# Patient Record
Sex: FEMALE | Race: WHITE | Employment: PART TIME | URBAN - NONMETROPOLITAN AREA
[De-identification: names, ages, dates, MRNs, and addresses within clinical notes are randomized per-mention and may not be internally consistent; named-entity substitution may affect disease eponyms.]

---
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
## 2022-03-01 ENCOUNTER — NURSE ONLY (OUTPATIENT)
Dept: LAB | Age: 22
End: 2022-03-01
Payer: COMMERCIAL

## 2022-03-01 DIAGNOSIS — N94.6 DYSMENORRHEA: Primary | ICD-10-CM

## 2022-03-01 PROCEDURE — PBSHW PBB SHADOW CHARGE: Performed by: ADVANCED PRACTICE MIDWIFE

## 2022-03-01 PROCEDURE — 96372 THER/PROPH/DIAG INJ SC/IM: CPT

## 2022-03-01 RX ADMIN — MEDROXYPROGESTERONE ACETATE 150 MG: 150 INJECTION, SUSPENSION, EXTENDED RELEASE INTRAMUSCULAR at 14:53

## 2022-06-01 ENCOUNTER — NURSE ONLY (OUTPATIENT)
Dept: LAB | Age: 22
End: 2022-06-01
Payer: COMMERCIAL

## 2022-06-01 ENCOUNTER — HOSPITAL ENCOUNTER (OUTPATIENT)
Dept: LAB | Age: 22
Discharge: HOME | End: 2022-06-01
Payer: COMMERCIAL

## 2022-06-01 ENCOUNTER — TELEPHONE (OUTPATIENT)
Dept: OBGYN | Age: 22
End: 2022-06-01

## 2022-06-01 DIAGNOSIS — Z32.00 UNCONFIRMED PREGNANCY: Primary | ICD-10-CM

## 2022-06-01 DIAGNOSIS — Z32.00 UNCONFIRMED PREGNANCY: ICD-10-CM

## 2022-06-01 DIAGNOSIS — N94.6 DYSMENORRHEA: Primary | ICD-10-CM

## 2022-06-01 LAB — HCG QUANTITATIVE: <1 MIU/ML

## 2022-06-01 PROCEDURE — 96372 THER/PROPH/DIAG INJ SC/IM: CPT

## 2022-06-01 PROCEDURE — 36415 COLL VENOUS BLD VENIPUNCTURE: CPT

## 2022-06-01 PROCEDURE — 84702 CHORIONIC GONADOTROPIN TEST: CPT

## 2022-06-01 PROCEDURE — PBSHW PBB SHADOW CHARGE: Performed by: ADVANCED PRACTICE MIDWIFE

## 2022-06-01 RX ADMIN — MEDROXYPROGESTERONE ACETATE 150 MG: 150 INJECTION, SUSPENSION, EXTENDED RELEASE INTRAMUSCULAR at 10:21

## 2022-06-01 NOTE — TELEPHONE ENCOUNTER
Was due no later ramires 5/31, she is in injection room now, has denied intercourse in last 7 days    Per elisa dias, needs stat quant then have depo

## 2022-06-01 NOTE — PROGRESS NOTES
Patient presents today for Depo Provera. Patient is late for injection and is aware of this. Patient reports she has not had intercourse within the past 7 days. OBGYN notified. Stat HCG, Quant, Pregnancy test ordered. Results negative. Depo Provera  150 mg given IM as ordered in right deltoid. Patient tolerated injection well. Reminder card with 12 weeks date provided.

## 2022-08-24 ENCOUNTER — TELEPHONE (OUTPATIENT)
Dept: OBGYN | Age: 22
End: 2022-08-24

## 2022-08-24 NOTE — TELEPHONE ENCOUNTER
Patient is here in the injection room for Depo and they need an order.  Her last one was 6/1 and she is within her window and is not scheduled to see you again until 9/26

## 2022-08-25 RX ORDER — MEDROXYPROGESTERONE ACETATE 150 MG/ML
150 INJECTION, SUSPENSION INTRAMUSCULAR ONCE
Status: COMPLETED | OUTPATIENT
Start: 2022-08-25 | End: 2022-08-29

## 2022-08-25 NOTE — TELEPHONE ENCOUNTER
Injection room called again needing another order for patient to get her depo shot. Patient is coming in today to get that done.  Please advise

## 2022-08-29 ENCOUNTER — NURSE ONLY (OUTPATIENT)
Dept: LAB | Age: 22
End: 2022-08-29
Payer: COMMERCIAL

## 2022-08-29 DIAGNOSIS — N94.6 DYSMENORRHEA: Primary | ICD-10-CM

## 2022-08-29 PROCEDURE — PBSHW PBB SHADOW CHARGE: Performed by: ADVANCED PRACTICE MIDWIFE

## 2022-08-29 PROCEDURE — 96372 THER/PROPH/DIAG INJ SC/IM: CPT

## 2022-08-29 RX ADMIN — MEDROXYPROGESTERONE ACETATE 150 MG: 150 INJECTION, SUSPENSION, EXTENDED RELEASE INTRAMUSCULAR at 16:22

## 2022-08-29 NOTE — PROGRESS NOTES
Depo Provera  150 mg given IM as ordered in right deltoid. Patient tolerated injection well. Reminder card with 12-13 weeks date provided.

## 2022-10-18 ENCOUNTER — OFFICE VISIT (OUTPATIENT)
Dept: PRIMARY CARE CLINIC | Age: 22
End: 2022-10-18
Payer: COMMERCIAL

## 2022-10-18 VITALS
WEIGHT: 152 LBS | BODY MASS INDEX: 25.95 KG/M2 | OXYGEN SATURATION: 98 % | HEIGHT: 64 IN | DIASTOLIC BLOOD PRESSURE: 82 MMHG | TEMPERATURE: 99 F | HEART RATE: 86 BPM | SYSTOLIC BLOOD PRESSURE: 136 MMHG

## 2022-10-18 DIAGNOSIS — R21 RASH: Primary | ICD-10-CM

## 2022-10-18 PROCEDURE — G8484 FLU IMMUNIZE NO ADMIN: HCPCS | Performed by: FAMILY MEDICINE

## 2022-10-18 PROCEDURE — G8427 DOCREV CUR MEDS BY ELIG CLIN: HCPCS | Performed by: FAMILY MEDICINE

## 2022-10-18 PROCEDURE — 99212 OFFICE O/P EST SF 10 MIN: CPT | Performed by: FAMILY MEDICINE

## 2022-10-18 PROCEDURE — 1036F TOBACCO NON-USER: CPT | Performed by: FAMILY MEDICINE

## 2022-10-18 PROCEDURE — 99213 OFFICE O/P EST LOW 20 MIN: CPT | Performed by: FAMILY MEDICINE

## 2022-10-18 PROCEDURE — G8419 CALC BMI OUT NRM PARAM NOF/U: HCPCS | Performed by: FAMILY MEDICINE

## 2022-10-18 RX ORDER — PREDNISONE 20 MG/1
TABLET ORAL
Qty: 15 TABLET | Refills: 0 | Status: SHIPPED | OUTPATIENT
Start: 2022-10-18

## 2022-10-18 ASSESSMENT — ENCOUNTER SYMPTOMS
COLOR CHANGE: 1
COUGH: 0
SHORTNESS OF BREATH: 0
DIARRHEA: 0

## 2022-10-18 NOTE — PROGRESS NOTES
Conjunctiva/sclera: Conjunctivae normal.   Pulmonary:      Effort: Pulmonary effort is normal.   Musculoskeletal:      Cervical back: Normal range of motion. Skin:     General: Skin is warm and dry. Coloration: Skin is not pale. Findings: Erythema and rash present. Comments: Erythematous spotty rash to the trunk and to the upper extremities. Rash is dry in appearance. Neurological:      Mental Status: She is alert and oriented to person, place, and time. Psychiatric:         Behavior: Behavior normal.         Thought Content: Thought content normal.         Judgment: Judgment normal.       ASSESSMENT/PLAN:  Encounter Diagnosis   Name Primary? Rash Yes     Orders Placed This Encounter   Medications    predniSONE (DELTASONE) 20 MG tablet     Si bid for 5 days, 1 qd for 5 days     Dispense:  15 tablet     Refill:  0     No orders of the defined types were placed in this encounter. RX as noted above. May be viral in nature vs reactive to possible exposure to  at work. Antihistamines as needed. Return  if no improvement in symptoms or if any further symptoms arise. No follow-ups on file. An electronic signature was used to authenticate this note.     --Deandre Nathan, DO on 10/18/2022 at 4:28 PM

## 2022-11-21 ENCOUNTER — NURSE ONLY (OUTPATIENT)
Dept: LAB | Age: 22
End: 2022-11-21
Payer: COMMERCIAL

## 2022-11-21 DIAGNOSIS — Z32.00 UNCONFIRMED PREGNANCY: Primary | ICD-10-CM

## 2022-11-21 PROCEDURE — PBSHW PBB SHADOW CHARGE: Performed by: ADVANCED PRACTICE MIDWIFE

## 2022-11-21 PROCEDURE — 96372 THER/PROPH/DIAG INJ SC/IM: CPT

## 2022-11-21 RX ORDER — MEDROXYPROGESTERONE ACETATE 150 MG/ML
150 INJECTION, SUSPENSION INTRAMUSCULAR ONCE
Status: COMPLETED | OUTPATIENT
Start: 2022-11-21 | End: 2022-11-21

## 2022-11-21 RX ADMIN — MEDROXYPROGESTERONE ACETATE 150 MG: 150 INJECTION, SUSPENSION, EXTENDED RELEASE INTRAMUSCULAR at 11:49

## 2022-11-21 NOTE — PROGRESS NOTES
Depo Provera 150 given IM as ordered in right deltoid.   Patient tolerated it well     She was also instructed to schedule her appointment with Petra Leija

## 2022-12-01 ENCOUNTER — OFFICE VISIT (OUTPATIENT)
Dept: OBGYN | Age: 22
End: 2022-12-01
Payer: COMMERCIAL

## 2022-12-01 ENCOUNTER — HOSPITAL ENCOUNTER (OUTPATIENT)
Age: 22
Setting detail: SPECIMEN
Discharge: HOME | End: 2022-12-01
Payer: COMMERCIAL

## 2022-12-01 VITALS
HEART RATE: 82 BPM | HEIGHT: 63 IN | DIASTOLIC BLOOD PRESSURE: 80 MMHG | BODY MASS INDEX: 26.93 KG/M2 | SYSTOLIC BLOOD PRESSURE: 112 MMHG | WEIGHT: 152 LBS

## 2022-12-01 DIAGNOSIS — Z11.3 ROUTINE SCREENING FOR STI (SEXUALLY TRANSMITTED INFECTION): ICD-10-CM

## 2022-12-01 DIAGNOSIS — N92.0 MENORRHAGIA WITH REGULAR CYCLE: ICD-10-CM

## 2022-12-01 DIAGNOSIS — N89.8 DISCHARGE FROM THE VAGINA: ICD-10-CM

## 2022-12-01 DIAGNOSIS — N94.6 DYSMENORRHEA: ICD-10-CM

## 2022-12-01 DIAGNOSIS — Z01.419 WOMEN'S ANNUAL ROUTINE GYNECOLOGICAL EXAMINATION: Primary | ICD-10-CM

## 2022-12-01 LAB
CANDIDA SPECIES, DNA PROBE: NEGATIVE
GARDNERELLA VAGINALIS, DNA PROBE: POSITIVE
SOURCE: ABNORMAL
TRICHOMONAS VAGINALIS DNA: NEGATIVE

## 2022-12-01 PROCEDURE — 87660 TRICHOMONAS VAGIN DIR PROBE: CPT

## 2022-12-01 PROCEDURE — G8484 FLU IMMUNIZE NO ADMIN: HCPCS | Performed by: ADVANCED PRACTICE MIDWIFE

## 2022-12-01 PROCEDURE — 99395 PREV VISIT EST AGE 18-39: CPT | Performed by: ADVANCED PRACTICE MIDWIFE

## 2022-12-01 PROCEDURE — 87480 CANDIDA DNA DIR PROBE: CPT

## 2022-12-01 PROCEDURE — 87510 GARDNER VAG DNA DIR PROBE: CPT

## 2022-12-01 PROCEDURE — 87591 N.GONORRHOEAE DNA AMP PROB: CPT

## 2022-12-01 PROCEDURE — 87491 CHLMYD TRACH DNA AMP PROBE: CPT

## 2022-12-01 RX ORDER — MEDROXYPROGESTERONE ACETATE 150 MG/ML
150 INJECTION, SUSPENSION INTRAMUSCULAR
Status: SHIPPED | OUTPATIENT
Start: 2022-12-01

## 2022-12-01 RX ORDER — MEDROXYPROGESTERONE ACETATE 150 MG/ML
150 INJECTION, SUSPENSION INTRAMUSCULAR
COMMUNITY

## 2022-12-01 RX ORDER — METRONIDAZOLE 500 MG/1
500 TABLET ORAL 2 TIMES DAILY
Qty: 14 TABLET | Refills: 0 | Status: SHIPPED | OUTPATIENT
Start: 2022-12-01 | End: 2022-12-08

## 2022-12-01 ASSESSMENT — ENCOUNTER SYMPTOMS
RESPIRATORY NEGATIVE: 1
GASTROINTESTINAL NEGATIVE: 1
EYES NEGATIVE: 1

## 2022-12-01 NOTE — PROGRESS NOTES
Subjective:      Patient ID: Dulce Maria Siu  is a 23 y.o. y.o. female. Adis Chicas presents today for annual examination. She is not currently sexually active. She is using depo provera to control painful menses. She reports she has slight spotting prior to her injection, dark red-brown. She reports no pain. She has no concerns. Review of Systems   Constitutional: Negative. HENT: Negative. Eyes: Negative. Respiratory: Negative. Cardiovascular: Negative. Gastrointestinal: Negative. Genitourinary:  Positive for menstrual problem. Musculoskeletal: Negative. Skin: Negative. Neurological: Negative. Psychiatric/Behavioral: Negative. Breast ROS: negative    Objective:   /80 (Site: Left Upper Arm, Position: Sitting, Cuff Size: Medium Adult)   Pulse 82   Ht 5' 3\" (1.6 m)   Wt 152 lb (68.9 kg)   BMI 26.93 kg/m²   Physical Exam  Constitutional:       Appearance: She is well-developed and normal weight. HENT:      Head: Normocephalic and atraumatic. Eyes:      Conjunctiva/sclera: Conjunctivae normal.   Cardiovascular:      Rate and Rhythm: Normal rate and regular rhythm. Heart sounds: Normal heart sounds. Pulmonary:      Effort: Pulmonary effort is normal.      Breath sounds: Normal breath sounds. Chest:   Breasts:     Breasts are symmetrical.      Right: No inverted nipple, mass, nipple discharge, skin change or tenderness. Left: No inverted nipple, mass, nipple discharge, skin change or tenderness. Abdominal:      Palpations: Abdomen is soft. Genitourinary:     General: Normal vulva. Vagina: Normal.      Rectum: Normal.      Comments: External genitalia WNL, no lesions noted. Vaginal canal is pink with rugae present and moderate amount of thin white malodorous discharge. Cervix is nulliparous, freely mobile and nontender. Uterus is NSSAVNT, adnexa are small, freely mobile and nontender. Musculoskeletal:         General: Normal range of motion. Cervical back: Normal range of motion and neck supple. Skin:     General: Skin is warm and dry. Neurological:      Mental Status: She is alert and oriented to person, place, and time. Deep Tendon Reflexes: Reflexes are normal and symmetric. Psychiatric:         Mood and Affect: Mood normal.         Thought Content: Thought content normal.       Sexually active: No  Any bleeding or pain withintercourse: No  Last Yearly:  11/2021  Last Pap: No previous  Do you do self breast exams: No  Negative Family History of Breast, Uterine of Ovarian Cancer  Assessment:      Diagnosis Orders   1. Women's annual routine gynecological examination        2. Discharge from the vagina  metroNIDAZOLE (FLAGYL) 500 MG tablet    Vaginitis DNA Probe      3. Routine screening for STI (sexually transmitted infection)  C.trachomatis N.gonorrhoeae DNA, Urine      4. Menorrhagia with regular cycle  medroxyPROGESTERone (DEPO-PROVERA) injection 150 mg      5. Dysmenorrhea  medroxyPROGESTERone (DEPO-PROVERA) injection 150 mg              Plan:      Orders Placed This Encounter   Procedures    C.trachomatis N.gonorrhoeae DNA, Urine     Standing Status:   Future     Standing Expiration Date:   1/1/2023    Vaginitis DNA Probe     Standing Status:   Future     Standing Expiration Date:   1/1/2023     Education: discussed and reviewed diet with hydration, need for calcium intake 0374-9945 mg./day and weight bearing exercise 30-50 minutes 3-5x/week. Will continue with depo provera for next 12 months. Education self breast exam, danger S&S. RTO 12 months and prn.

## 2022-12-06 NOTE — RESULT ENCOUNTER NOTE
Abnormal test results, please notify patient. BV present, she has been appropriately treated.  DA/CNM

## 2023-02-13 ENCOUNTER — NURSE ONLY (OUTPATIENT)
Dept: LAB | Age: 23
End: 2023-02-13
Payer: COMMERCIAL

## 2023-02-13 DIAGNOSIS — N94.6 DYSMENORRHEA: Primary | ICD-10-CM

## 2023-02-13 PROCEDURE — PBSHW PBB SHADOW CHARGE: Performed by: ADVANCED PRACTICE MIDWIFE

## 2023-02-13 PROCEDURE — 96372 THER/PROPH/DIAG INJ SC/IM: CPT

## 2023-02-13 RX ADMIN — MEDROXYPROGESTERONE ACETATE 150 MG: 150 INJECTION, SUSPENSION, EXTENDED RELEASE INTRAMUSCULAR at 15:24

## 2023-03-01 ENCOUNTER — HOSPITAL ENCOUNTER (EMERGENCY)
Age: 23
Discharge: HOME | End: 2023-03-01
Attending: STUDENT IN AN ORGANIZED HEALTH CARE EDUCATION/TRAINING PROGRAM
Payer: COMMERCIAL

## 2023-03-01 VITALS
HEIGHT: 63 IN | WEIGHT: 163 LBS | DIASTOLIC BLOOD PRESSURE: 84 MMHG | BODY MASS INDEX: 28.88 KG/M2 | HEART RATE: 115 BPM | RESPIRATION RATE: 19 BRPM | TEMPERATURE: 98.7 F | SYSTOLIC BLOOD PRESSURE: 139 MMHG

## 2023-03-01 DIAGNOSIS — R04.0 EPISTAXIS: Primary | ICD-10-CM

## 2023-03-01 LAB
ABSOLUTE EOS #: 0.31 K/UL (ref 0–0.44)
ABSOLUTE IMMATURE GRANULOCYTE: <0.03 K/UL (ref 0–0.3)
ABSOLUTE LYMPH #: 2.09 K/UL (ref 1.2–5.2)
ABSOLUTE MONO #: 0.74 K/UL (ref 0.1–1.4)
BASOPHILS # BLD: 1 % (ref 0–2)
BASOPHILS ABSOLUTE: 0.05 K/UL (ref 0–0.2)
EOSINOPHILS RELATIVE PERCENT: 5 % (ref 1–4)
HCT VFR BLD AUTO: 40.1 % (ref 36.3–47.1)
HGB BLD-MCNC: 12.1 G/DL (ref 11.9–15.1)
IMMATURE GRANULOCYTES: 0 %
LYMPHOCYTES # BLD: 30 % (ref 25–45)
MCH RBC QN AUTO: 22.2 PG (ref 25.2–33.5)
MCHC RBC AUTO-ENTMCNC: 30.2 G/DL (ref 25.2–33.5)
MCV RBC AUTO: 73.6 FL (ref 82.6–102.9)
MONOCYTES # BLD: 11 % (ref 2–8)
NRBC AUTOMATED: 0 PER 100 WBC
PDW BLD-RTO: 15 % (ref 11.8–14.4)
PLATELET # BLD AUTO: 386 K/UL (ref 138–453)
PMV BLD AUTO: 8.6 FL (ref 8.1–13.5)
RBC # BLD: 5.45 M/UL (ref 3.95–5.11)
RBC # BLD: ABNORMAL 10*6/UL
SEG NEUTROPHILS: 53 % (ref 34–64)
SEGMENTED NEUTROPHILS ABSOLUTE COUNT: 3.68 K/UL (ref 1.8–8)
WBC # BLD AUTO: 6.9 K/UL (ref 4.5–13.5)

## 2023-03-01 PROCEDURE — 2580000003 HC RX 258: Performed by: STUDENT IN AN ORGANIZED HEALTH CARE EDUCATION/TRAINING PROGRAM

## 2023-03-01 PROCEDURE — 85025 COMPLETE CBC W/AUTO DIFF WBC: CPT

## 2023-03-01 PROCEDURE — 96360 HYDRATION IV INFUSION INIT: CPT

## 2023-03-01 PROCEDURE — 99284 EMERGENCY DEPT VISIT MOD MDM: CPT

## 2023-03-01 PROCEDURE — 36415 COLL VENOUS BLD VENIPUNCTURE: CPT

## 2023-03-01 RX ORDER — 0.9 % SODIUM CHLORIDE 0.9 %
1000 INTRAVENOUS SOLUTION INTRAVENOUS ONCE
Status: COMPLETED | OUTPATIENT
Start: 2023-03-01 | End: 2023-03-01

## 2023-03-01 RX ADMIN — SODIUM CHLORIDE 1000 ML: 9 INJECTION, SOLUTION INTRAVENOUS at 09:42

## 2023-03-01 ASSESSMENT — ENCOUNTER SYMPTOMS
ABDOMINAL PAIN: 0
COUGH: 0
SHORTNESS OF BREATH: 0
NAUSEA: 0

## 2023-03-01 ASSESSMENT — PAIN - FUNCTIONAL ASSESSMENT: PAIN_FUNCTIONAL_ASSESSMENT: NONE - DENIES PAIN

## 2023-03-01 NOTE — ED PROVIDER NOTES
[facility] ED  [address]  [place] [address]  Phone: [phone] ED  EMERGENCY DEPARTMENT ENCOUNTER      Pt Name: Maranda Rebolledo  MRN: [id number]  A[place]fp[place] 2000  Date of evaluation: 3/1/2023  Provider: Estella Choe DO    CHIEF COMPLAINT       Chief Complaint   Patient presents with    Epistaxis     AVM runs in the family, patient had a nose bleed this morning and collapsed at work. Patient remains dizzy         HISTORY OF PRESENT ILLNESS   (Location/Symptom, Timing/Onset,Context/Setting, Quality, Duration, Modifying Factors, Severity)  Note limiting factors. Maranda Rebolledo is a 23 y.o. female who presents to the emergency department with nosebleed that has stopped. Patient had a nosebleed this morning while at work and collapsed. She complains of dizziness. Per mother, AVM runs in the family and would like patient tested for this with a specialized lab test which I discussed that we do not have. Never followed up with the ENT. She was in the ER couple of years ago for a similar incident and they prescribed her for iron and mother states the iron is not helping with the nosebleed frequency. She denies any abdominal pain, chest pain, shortness of breath. History of dysmenorrhea, denies any concern for pregnancy at this time. Nursing Notes were reviewed. REVIEW OF SYSTEMS    (2-9systems for level 4, 10 or more for level 5)     Review of Systems   Constitutional:  Positive for fatigue. Negative for fever. HENT:  Positive for nosebleeds. Negative for congestion. Respiratory:  Negative for cough and shortness of breath. Cardiovascular:  Negative for chest pain and palpitations. Gastrointestinal:  Negative for abdominal pain and nausea. Musculoskeletal:  Negative for neck pain and neck stiffness. Neurological:  Positive for syncope and light-headedness.            PAST MEDICAL HISTORY   No past medical history on file.      SURGICAL HISTORY     No past surgical history on file. CURRENT MEDICATIONS     Discharge Medication List as of 3/1/2023 10:10 AM        CONTINUE these medications which have NOT CHANGED    Details   medroxyPROGESTERone (DEPO-PROVERA) 150 MG/ML injection Inject 150 mg into the muscle every 3 monthsHistorical Med      predniSONE (DELTASONE) 20 MG tablet 1 bid for 5 days, 1 qd for 5 days, Disp-15 tablet, R-0Normal             ALLERGIES     Patient has no known allergies. FAMILY HISTORY       Family History   Problem Relation Age of Onset    Other Mother         brain surgery-abscess    Cancer Maternal Grandmother           SOCIAL HISTORY       Social History     Socioeconomic History    Marital status: Single   Tobacco Use    Smoking status: Never    Smokeless tobacco: Never   Vaping Use    Vaping Use: Never used   Substance and Sexual Activity    Alcohol use: No    Drug use: No    Sexual activity: Not Currently     Partners: Male       SCREENINGS    [place] Coma Scale  Eye Opening: Spontaneous  Best Verbal Response: Oriented  Best Motor Response: Obeys commands  [place] Coma Scale Score: 15        PHYSICAL EXAM    (up to 7 for level 4, 8 or more for level 5)     ED Triage Vitals [03/01/23 0923]   BP Temp Temp Source Heart Rate Resp SpO2 Height Weight   139/84 98.7 °F (37.1 °C) Tympanic (!) 115 19 -- 5' 3\" (1.6 m) 163 lb (73.9 kg)       Physical Exam  Vitals and nursing note reviewed. Constitutional:       General: She is not in acute distress. Appearance: She is well-developed. She is not diaphoretic. HENT:      Head: Normocephalic and atraumatic. Nose:      Comments: Dried blood left nare, no nasal septal hematoma     Mouth/Throat:      Mouth: Mucous membranes are moist.      Pharynx: Oropharynx is clear. Eyes:      General: No scleral icterus. Conjunctiva/sclera: Conjunctivae normal.      Pupils: Pupils are equal, round, and reactive to light. Neck:      Vascular: No JVD. Trachea: No tracheal deviation. Cardiovascular:      Rate and Rhythm: Regular rhythm. Tachycardia present. Pulses: Normal pulses. Heart sounds: Normal heart sounds. Pulmonary:      Effort: Pulmonary effort is normal. No respiratory distress. Breath sounds: Normal breath sounds. No wheezing. Chest:      Chest wall: No tenderness. Musculoskeletal:      Cervical back: Normal range of motion and neck supple. Skin:     General: Skin is warm and dry. Capillary Refill: Capillary refill takes less than 2 seconds. Coloration: Skin is not pale. Findings: No erythema. Neurological:      General: No focal deficit present. Mental Status: She is alert. Cranial Nerves: No cranial nerve deficit. Sensory: No sensory deficit. Psychiatric:         Behavior: Behavior normal.       EMERGENCY DEPARTMENT COURSE and DIFFERENTIAL DIAGNOSIS/MDM:   Vitals:    Vitals:    03/01/23 0923   BP: 139/84   Pulse: (!) 115   Resp: 19   Temp: 98.7 °F (37.1 °C)   TempSrc: Tympanic   Weight: 163 lb (73.9 kg)   Height: 5' 3\" (1.6 m)     (Acute and chronic problems, notes/tests reviewed, historian, interpretation of tests, discussion, treatments/procedures and monitoring)    61-year-old female with lightheadedness and syncope while having a nosebleed. Recurrent nosebleeds. Is on iron, family thought this was supposed to help with the nosebleed but has not been. Reports she felt lightheaded and passed out during the nosebleed. Nosebleed stopped prior to arrival.  Since a family history of AVMs. Has not followed up with anyone regarding his ever since being placed on iron 2 years ago. Plan for CBC, IV fluids, outpatient referral.    Patient feels improved after IV fluids. Hemoglobin 12, appropriate for age and sex. Will have patient follow-up as outpatient.     The patient understands that at this time there is no evidence for a more malignant underlying process, but also understands that early in the process of an illness or injury, an emergency department workup can be falsely reassuring. Routine discharge counseling was given, and it is understood that worsening, changing or persistent symptoms should prompt an immediate call or follow up with their primary physician or return to the emergency department. The importance of appropriate follow up was also discussed. I have reviewed the disposition diagnosis. I have answered the questions and given discharge instructions. There was voiced understanding of these instructions and no further questions or complaints. DIAGNOSTIC RESULTS     LABS:  Labs Reviewed   CBC WITH AUTO DIFFERENTIAL - Abnormal; Notable for the following components:       Result Value    RBC 5.45 (*)     MCV 73.6 (*)     MCH 22.2 (*)     RDW 15.0 (*)     Monocytes 11 (*)     Eosinophils % 5 (*)     All other components within normal limits       All other labs were within normal range or not returned as of this dictation.     RADIOLOGY:  No orders to display               PROCEDURES:  Unless otherwise noted below, none        FINAL IMPRESSION      1. Epistaxis Stable         DISPOSITION/PLAN   DISPOSITION Decision To Discharge 03/01/2023 10:09:26 AM      PATIENT REFERRED TO:  ELMIRA Amaral CNP  [address]  [phone]    Schedule an appointment as soon as possible for a visit in 3 days      Dean Campos MD  [address] [facility]  [phone]    Schedule an appointment as soon as possible for a visit in 1 week      [facility] [address] [id number] [phone]  Schedule an appointment as soon as possible for a visit in 1 week      [facility]  [address]  [phone]  Go to   If symptoms worsen    DISCHARGE MEDICATIONS:  Discharge Medication List as of 3/1/2023 10:10 AM        START taking these medications    Details   sodium chloride (OCEAN, BABY AYR) 0.65 % nasal spray 1 spray by Nasal route every 2 hours as needed for Congestion, Disp-1 each, R-0Normal                (Please note that portions of this note were completed with a voice recognition program.  Efforts were made to edit the dictations but occasionally words are mis-transcribed.)    Myranda Eubanks DO  Emergency Physician        Fatmata Neil DO  03/01/23 1018

## 2023-03-01 NOTE — Clinical Note
Angela Bravo was seen and treated in our emergency department on 3/1/2023. She may return to work on 03/02/2023. If you have any questions or concerns, please don't hesitate to call.       Josephine Powell, DO

## 2023-03-01 NOTE — DISCHARGE INSTRUCTIONS
Call today / tomorrow for a follow up with Dr Wasserman or your PCP in 3 days    Nonprescription saline nasal sprays and nose drops can be used to keep your nasal tissues moist, relieve nasal irritation and help thick or dried mucus to drain.  You can also use Vaseline to the inside of your nose.    Saline nose drops can be purchased over the counter or can be made easily at home:  Step 1  Boil tap water for 10 minutes to remove all of the impurities and get the water hot enough to dissolve the salt. Pour one cup of the boiling hot water into a liquid measuring cup with a spout (to pour easily later).  Step 2  Add one teaspoon of baking soda and one teaspoon of sea salt to the boiling water in the measuring cup. Stir until the water is completely clear again, which insures that the baking soda and salt dissolve completely.  Step 3  Allow the water to cool to room temperature. Then, pour the water into a clean nasal spray bottle (available at [facility]).  While lying on the bed with your head hanging over the side of the bed, squeeze the saline nasal spray one to three times into each nostril, inhaling through the nose as you squeeze in the fluid.  If you do not have a dropper, then use a bulb syringe and gently squirt the solution into your nose, or snuff the solution from the palm of your hand, one nostril at a time.  Step 4  Store the remaining saline nasal spray in glass jar or plastic container with a lid. Refill your nasal spray bottle as needed.  Make a fresh solution every 3 days.    Use the saline spray, or obtain a humidifier for your bed room.    Return to the emergency department for worsening of pain, return of nose bleed, fever > 101.5, excessive nausea or vomiting, any other care or concern.

## 2023-03-28 ENCOUNTER — OFFICE VISIT (OUTPATIENT)
Dept: PRIMARY CARE CLINIC | Age: 23
End: 2023-03-28
Payer: COMMERCIAL

## 2023-03-28 VITALS
BODY MASS INDEX: 28.17 KG/M2 | WEIGHT: 159 LBS | HEIGHT: 63 IN | RESPIRATION RATE: 14 BRPM | SYSTOLIC BLOOD PRESSURE: 116 MMHG | OXYGEN SATURATION: 98 % | DIASTOLIC BLOOD PRESSURE: 52 MMHG | HEART RATE: 104 BPM | TEMPERATURE: 99.7 F

## 2023-03-28 DIAGNOSIS — R05.1 ACUTE COUGH: ICD-10-CM

## 2023-03-28 DIAGNOSIS — U07.1 COVID-19: Primary | ICD-10-CM

## 2023-03-28 DIAGNOSIS — J02.9 SORE THROAT: ICD-10-CM

## 2023-03-28 LAB
INFLUENZA A ANTIGEN, POC: NEGATIVE
INFLUENZA B ANTIGEN, POC: NEGATIVE
LOT EXPIRE DATE: ABNORMAL
LOT KIT NUMBER: ABNORMAL
S PYO AG THROAT QL: NORMAL
SARS-COV-2, POC: DETECTED
VALID INTERNAL CONTROL: ABNORMAL
VENDOR AND KIT NAME POC: ABNORMAL

## 2023-03-28 PROCEDURE — PBSHW POCT RAPID STREP A: Performed by: NURSE PRACTITIONER

## 2023-03-28 PROCEDURE — 87880 STREP A ASSAY W/OPTIC: CPT | Performed by: NURSE PRACTITIONER

## 2023-03-28 PROCEDURE — 99212 OFFICE O/P EST SF 10 MIN: CPT | Performed by: NURSE PRACTITIONER

## 2023-03-28 PROCEDURE — 1036F TOBACCO NON-USER: CPT | Performed by: NURSE PRACTITIONER

## 2023-03-28 PROCEDURE — 87428 SARSCOV & INF VIR A&B AG IA: CPT | Performed by: NURSE PRACTITIONER

## 2023-03-28 PROCEDURE — G8419 CALC BMI OUT NRM PARAM NOF/U: HCPCS | Performed by: NURSE PRACTITIONER

## 2023-03-28 PROCEDURE — 99213 OFFICE O/P EST LOW 20 MIN: CPT | Performed by: NURSE PRACTITIONER

## 2023-03-28 PROCEDURE — G8484 FLU IMMUNIZE NO ADMIN: HCPCS | Performed by: NURSE PRACTITIONER

## 2023-03-28 PROCEDURE — G8427 DOCREV CUR MEDS BY ELIG CLIN: HCPCS | Performed by: NURSE PRACTITIONER

## 2023-03-28 PROCEDURE — PBSHW POCT COVID-19 & INFLUENZA A/B: Performed by: NURSE PRACTITIONER

## 2023-03-28 SDOH — ECONOMIC STABILITY: FOOD INSECURITY: WITHIN THE PAST 12 MONTHS, YOU WORRIED THAT YOUR FOOD WOULD RUN OUT BEFORE YOU GOT MONEY TO BUY MORE.: NEVER TRUE

## 2023-03-28 SDOH — ECONOMIC STABILITY: FOOD INSECURITY: WITHIN THE PAST 12 MONTHS, THE FOOD YOU BOUGHT JUST DIDN'T LAST AND YOU DIDN'T HAVE MONEY TO GET MORE.: NEVER TRUE

## 2023-03-28 SDOH — ECONOMIC STABILITY: HOUSING INSECURITY
IN THE LAST 12 MONTHS, WAS THERE A TIME WHEN YOU DID NOT HAVE A STEADY PLACE TO SLEEP OR SLEPT IN A SHELTER (INCLUDING NOW)?: NO

## 2023-03-28 SDOH — ECONOMIC STABILITY: INCOME INSECURITY: HOW HARD IS IT FOR YOU TO PAY FOR THE VERY BASICS LIKE FOOD, HOUSING, MEDICAL CARE, AND HEATING?: NOT HARD AT ALL

## 2023-03-28 ASSESSMENT — ENCOUNTER SYMPTOMS
WHEEZING: 0
COUGH: 1
SHORTNESS OF BREATH: 0
RHINORRHEA: 0
SORE THROAT: 1
CHEST TIGHTNESS: 0
SINUS COMPLAINT: 1

## 2023-03-28 ASSESSMENT — PATIENT HEALTH QUESTIONNAIRE - PHQ9
SUM OF ALL RESPONSES TO PHQ QUESTIONS 1-9: 0
1. LITTLE INTEREST OR PLEASURE IN DOING THINGS: 0
SUM OF ALL RESPONSES TO PHQ9 QUESTIONS 1 & 2: 0
2. FEELING DOWN, DEPRESSED OR HOPELESS: 0
SUM OF ALL RESPONSES TO PHQ QUESTIONS 1-9: 0

## 2023-03-28 NOTE — LETTER
[address] [facility] A department of [facility]  Phone: [phone]  Fax: [phone]        ELMIRA Baxter CNP      March 28, 2023    Patient:   Elmer Meol  Date of Birth   2000  Date of visit   3/28/2023        To Whom it May Concern:      Elmer Melo was seen in my clinic on 3/28/2023. Please excuse from work 03/28/23 thru 03/31/23. May return to work on 04/01/23. If you have any questions or concerns, please don't hesitate to call.       Sincerely,      ELMIRA Baxter CNP

## 2023-03-28 NOTE — PROGRESS NOTES
Sitting   Cuff Size: Medium Adult   Pulse: (!) 104   Resp: 14   Temp: 99.7 °F (37.6 °C)   TempSrc: Tympanic   SpO2: 98%   Weight: 159 lb (72.1 kg)   Height: 5' 3\" (1.6 m)     Body mass index is 28.17 kg/m². Review of Systems   Constitutional: Negative. Negative for chills. HENT:  Positive for congestion, postnasal drip and sore throat (mild). Negative for rhinorrhea. Respiratory:  Positive for cough. Negative for chest tightness, shortness of breath and wheezing. Cardiovascular: Negative. Neurological:  Positive for headaches (intermittent). Physical Exam  Vitals and nursing note reviewed. Constitutional:       Appearance: Normal appearance. She is well-developed. HENT:      Head: Normocephalic. Jaw: There is normal jaw occlusion. Right Ear: Tympanic membrane, ear canal and external ear normal.      Left Ear: Tympanic membrane, ear canal and external ear normal.      Nose: Congestion present. Right Turbinates: Swollen (erythema). Left Turbinates: Swollen (erythema). Right Sinus: No maxillary sinus tenderness or frontal sinus tenderness. Left Sinus: No maxillary sinus tenderness or frontal sinus tenderness. Mouth/Throat:      Lips: Pink. Mouth: Mucous membranes are moist.      Pharynx: Oropharynx is clear. Uvula midline. Posterior oropharyngeal erythema (light) present. Eyes:      Pupils: Pupils are equal, round, and reactive to light. Cardiovascular:      Rate and Rhythm: Normal rate and regular rhythm. Heart sounds: Normal heart sounds. Pulmonary:      Effort: Pulmonary effort is normal.      Breath sounds: Normal breath sounds and air entry. Musculoskeletal:      Cervical back: Normal range of motion and neck supple. Lymphadenopathy:      Cervical: No cervical adenopathy. Skin:     General: Skin is warm and dry. Neurological:      General: No focal deficit present.       Mental Status: She is alert and oriented to person, place, and

## 2023-05-17 ENCOUNTER — HOSPITAL ENCOUNTER (OUTPATIENT)
Age: 23
Discharge: HOME | End: 2023-05-17
Payer: COMMERCIAL

## 2023-05-17 ENCOUNTER — NURSE ONLY (OUTPATIENT)
Dept: LAB | Age: 23
End: 2023-05-17
Payer: COMMERCIAL

## 2023-05-17 DIAGNOSIS — Z01.812 PRE-PROCEDURE LAB EXAM: Primary | ICD-10-CM

## 2023-05-17 DIAGNOSIS — Z01.812 PRE-PROCEDURE LAB EXAM: ICD-10-CM

## 2023-05-17 DIAGNOSIS — N94.6 DYSMENORRHEA: Primary | ICD-10-CM

## 2023-05-17 LAB — HCG QUANTITATIVE: <1 MIU/ML

## 2023-05-17 PROCEDURE — 36415 COLL VENOUS BLD VENIPUNCTURE: CPT

## 2023-05-17 PROCEDURE — PBSHW PBB SHADOW CHARGE: Performed by: ADVANCED PRACTICE MIDWIFE

## 2023-05-17 PROCEDURE — 84702 CHORIONIC GONADOTROPIN TEST: CPT

## 2023-05-17 RX ADMIN — MEDROXYPROGESTERONE ACETATE 150 MG: 150 INJECTION, SUSPENSION INTRAMUSCULAR at 09:29

## 2023-05-17 NOTE — PROGRESS NOTES
Patient presents today for Depo Provera. Patient is late for injection and is aware of this. Patient reports she has not had unprotected intercourse within the past 7 days. OBGYN notified. Stat HCG, Quant, Pregnancy test ordered. Results negative. Depo Provera  150 mg given IM as ordered in right deltoid. Patient tolerated injection well. Reminder card with 12-13 weeks date provided.

## 2023-08-09 ENCOUNTER — NURSE ONLY (OUTPATIENT)
Dept: LAB | Age: 23
End: 2023-08-09
Payer: COMMERCIAL

## 2023-08-09 DIAGNOSIS — N94.6 DYSMENORRHEA: Primary | ICD-10-CM

## 2023-08-09 PROCEDURE — PBSHW PBB SHADOW CHARGE: Performed by: ADVANCED PRACTICE MIDWIFE

## 2023-08-09 RX ADMIN — MEDROXYPROGESTERONE ACETATE 150 MG: 150 INJECTION, SUSPENSION INTRAMUSCULAR at 10:20

## 2023-08-09 NOTE — PROGRESS NOTES
Depo Provera  150 mg given IM as ordered in right deltoid. Patient tolerated injection well. Patient will need a new order prior to next OBGYN appt.

## 2023-08-19 ENCOUNTER — HOSPITAL ENCOUNTER (EMERGENCY)
Age: 23
Discharge: HOME | End: 2023-08-19
Attending: EMERGENCY MEDICINE
Payer: COMMERCIAL

## 2023-08-19 VITALS
BODY MASS INDEX: 26.46 KG/M2 | HEIGHT: 64 IN | DIASTOLIC BLOOD PRESSURE: 75 MMHG | TEMPERATURE: 98 F | RESPIRATION RATE: 19 BRPM | OXYGEN SATURATION: 98 % | HEART RATE: 99 BPM | WEIGHT: 155 LBS | SYSTOLIC BLOOD PRESSURE: 131 MMHG

## 2023-08-19 DIAGNOSIS — H10.503 BLEPHAROCONJUNCTIVITIS OF BOTH EYES, UNSPECIFIED BLEPHAROCONJUNCTIVITIS TYPE: Primary | ICD-10-CM

## 2023-08-19 PROCEDURE — 99283 EMERGENCY DEPT VISIT LOW MDM: CPT

## 2023-08-19 RX ORDER — GENTAMICIN SULFATE 3 MG/ML
1 SOLUTION/ DROPS OPHTHALMIC 4 TIMES DAILY
Qty: 1 EACH | Refills: 0 | Status: SHIPPED | OUTPATIENT
Start: 2023-08-19 | End: 2023-08-26

## 2023-08-19 ASSESSMENT — ENCOUNTER SYMPTOMS
EYE REDNESS: 1
NAUSEA: 0
EYE DISCHARGE: 1
SHORTNESS OF BREATH: 0
ABDOMINAL PAIN: 0
CONSTIPATION: 0
EYE PAIN: 0
VOMITING: 0
DIARRHEA: 0
BACK PAIN: 0
BLOOD IN STOOL: 0
COUGH: 0

## 2023-08-19 ASSESSMENT — LIFESTYLE VARIABLES
HOW MANY STANDARD DRINKS CONTAINING ALCOHOL DO YOU HAVE ON A TYPICAL DAY: PATIENT DOES NOT DRINK
HOW OFTEN DO YOU HAVE A DRINK CONTAINING ALCOHOL: NEVER

## 2023-08-19 ASSESSMENT — PAIN - FUNCTIONAL ASSESSMENT: PAIN_FUNCTIONAL_ASSESSMENT: NONE - DENIES PAIN

## 2023-08-19 NOTE — ED PROVIDER NOTES
[facility]  eMERGENCY dEPARTMENT eNCOUnter      Pt Name: Nesha Brown  MRN: [id number]  [address] [facility] 2000  Date of evaluation: 8/19/2023      CHIEF COMPLAINT       Chief Complaint   Patient presents with    Conjunctivitis     Patient reports onset of redness and itching to bilateral eyes x 1 week. Patient denies any drainage, denies any complications with vision. Reports she took OTC allergy meds and eye drops that haven't provided any relief of her s/s. HISTORY OF PRESENT ILLNESS    Nesha Brown is a 26 y.o. female who presents with chief complaint of conjunctivitis. For about a week she thought initially was due to her allergies she had a bit of a scratchy throat she is taken over-the-counter meds both eyedrops and antihistamines and has not helped she says she is a little crusting there is been no visual changes no eye pain no foreign body sensation      REVIEW OF SYSTEMS         Review of Systems   Constitutional:  Negative for chills and fever. HENT:  Negative for congestion and ear pain. Eyes:  Positive for discharge and redness. Negative for pain and visual disturbance. Respiratory:  Negative for cough and shortness of breath. Cardiovascular:  Negative for chest pain, palpitations and leg swelling. Gastrointestinal:  Negative for abdominal pain, blood in stool, constipation, diarrhea, nausea and vomiting. Endocrine: Negative for polydipsia and polyuria. Genitourinary:  Negative for difficulty urinating, dysuria and frequency. Musculoskeletal:  Negative for back pain, joint swelling, myalgias, neck pain and neck stiffness. Skin:  Negative for rash. Neurological:  Negative for dizziness, weakness and headaches. Hematological:  Negative for adenopathy. Does not bruise/bleed easily. Psychiatric/Behavioral:  Negative for confusion, self-injury and suicidal ideas. PAST MEDICAL HISTORY    has no past medical history on file.     SURGICAL HISTORY      has no

## 2024-12-18 NOTE — PROGRESS NOTES
Depo Provera  150 mg given IM as ordered in right deltoid. Patient tolerated injection well. Reminder card provided with 12 weeks date. Not applicable